# Patient Record
Sex: MALE
[De-identification: names, ages, dates, MRNs, and addresses within clinical notes are randomized per-mention and may not be internally consistent; named-entity substitution may affect disease eponyms.]

---

## 2021-11-05 PROBLEM — Z00.00 ENCOUNTER FOR PREVENTIVE HEALTH EXAMINATION: Status: ACTIVE | Noted: 2021-11-05

## 2021-12-10 ENCOUNTER — APPOINTMENT (OUTPATIENT)
Dept: OTOLARYNGOLOGY | Facility: CLINIC | Age: 21
End: 2021-12-10

## 2022-05-11 ENCOUNTER — RESULT REVIEW (OUTPATIENT)
Age: 22
End: 2022-05-11

## 2022-05-11 ENCOUNTER — APPOINTMENT (OUTPATIENT)
Dept: ORTHOPEDIC SURGERY | Facility: CLINIC | Age: 22
End: 2022-05-11
Payer: COMMERCIAL

## 2022-05-11 VITALS — BODY MASS INDEX: 18.51 KG/M2 | WEIGHT: 125 LBS | HEIGHT: 69 IN

## 2022-05-11 DIAGNOSIS — Z78.9 OTHER SPECIFIED HEALTH STATUS: ICD-10-CM

## 2022-05-11 DIAGNOSIS — I51.9 HEART DISEASE, UNSPECIFIED: ICD-10-CM

## 2022-05-11 DIAGNOSIS — M51.26 OTHER INTERVERTEBRAL DISC DISPLACEMENT, LUMBAR REGION: ICD-10-CM

## 2022-05-11 PROCEDURE — 72170 X-RAY EXAM OF PELVIS: CPT

## 2022-05-11 PROCEDURE — 99214 OFFICE O/P EST MOD 30 MIN: CPT

## 2022-05-11 PROCEDURE — 72110 X-RAY EXAM L-2 SPINE 4/>VWS: CPT

## 2022-05-11 PROCEDURE — 99204 OFFICE O/P NEW MOD 45 MIN: CPT

## 2022-05-11 NOTE — HISTORY OF PRESENT ILLNESS
[Lower back] : lower back [6] : 6 [2] : 2 [Localized] : localized [Intermittent] : intermittent [Household chores] : household chores [Rest] : rest [Sitting] : sitting [de-identified] : 5/11/22: 22 yo YARI coleman presents for evaluation for his low back, pain ongoing since 3/2022 after getting off the train. He also had a stomach ache at the time the pain started. Symptoms resolved, and returned about a month ago. He denies injury, but reports he was sitting a lot prior to the pain starting. Pain is only with sitting. Pain is to the middle of the back, no leg pain or N/t. \par \par He has been stretching. He saw his PCP last week where he was given an anti-inflammatory without relief. \par \par No prior spine issues. No loss of b/b control. No night pain.\par \par No acupucture/chirocare/PT/Prior spine surgery\par No prior surgery\par \par No hx diabates/cancer\par \par Takes metoprolol for an arrythmia \par \par  [place]\par \par xrays today\par L-spine 4V\par AP pelvis - no fractures [] : no [[email or web address]] : no injury  [de-identified] : nothing

## 2022-05-11 NOTE — DISCUSSION/SUMMARY
[de-identified] : lower back pain - indicated for PT for treatment - indicated for MRi L spine \par MDP \par fu the MRi L spine

## 2022-06-15 ENCOUNTER — APPOINTMENT (OUTPATIENT)
Dept: ORTHOPEDIC SURGERY | Facility: CLINIC | Age: 22
End: 2022-06-15

## 2022-12-15 ENCOUNTER — APPOINTMENT (OUTPATIENT)
Dept: OTOLARYNGOLOGY | Facility: CLINIC | Age: 22
End: 2022-12-15

## 2023-01-09 ENCOUNTER — RX RENEWAL (OUTPATIENT)
Age: 23
End: 2023-01-09

## 2023-01-09 RX ORDER — METHYLPREDNISOLONE 4 MG/1
4 TABLET ORAL
Qty: 1 | Refills: 0 | Status: ACTIVE | COMMUNITY
Start: 2022-05-11 | End: 1900-01-01

## 2024-07-12 ENCOUNTER — NON-APPOINTMENT (OUTPATIENT)
Age: 24
End: 2024-07-12